# Patient Record
Sex: MALE | Race: WHITE | HISPANIC OR LATINO
[De-identification: names, ages, dates, MRNs, and addresses within clinical notes are randomized per-mention and may not be internally consistent; named-entity substitution may affect disease eponyms.]

---

## 2019-05-25 ENCOUNTER — APPOINTMENT (OUTPATIENT)
Dept: PEDIATRICS | Facility: CLINIC | Age: 19
End: 2019-05-25
Payer: COMMERCIAL

## 2019-05-25 VITALS
DIASTOLIC BLOOD PRESSURE: 80 MMHG | HEART RATE: 86 BPM | HEIGHT: 66.75 IN | WEIGHT: 259.2 LBS | BODY MASS INDEX: 40.68 KG/M2 | SYSTOLIC BLOOD PRESSURE: 124 MMHG

## 2019-05-25 DIAGNOSIS — E66.9 OBESITY, UNSPECIFIED: ICD-10-CM

## 2019-05-25 DIAGNOSIS — Z00.00 ENCOUNTER FOR GENERAL ADULT MEDICAL EXAMINATION W/OUT ABNORMAL FINDINGS: ICD-10-CM

## 2019-05-25 PROCEDURE — 99395 PREV VISIT EST AGE 18-39: CPT | Mod: 25

## 2019-05-25 PROCEDURE — 99051 MED SERV EVE/WKEND/HOLIDAY: CPT

## 2019-05-25 PROCEDURE — 96127 BRIEF EMOTIONAL/BEHAV ASSMT: CPT

## 2019-05-25 PROCEDURE — 90471 IMMUNIZATION ADMIN: CPT

## 2019-05-25 PROCEDURE — 90651 9VHPV VACCINE 2/3 DOSE IM: CPT

## 2019-05-25 NOTE — DISCUSSION/SUMMARY
[] : Counseling for  all components of the vaccines given today (see orders below) discussed with patient and patient’s parent/legal guardian. VIS statement provided as well. All questions answered. [Met privately with the adolescent for part of the office visit?] : Met privately with the adolescent for part of the office visit? Yes [Adolescent asks questions during each office  visit and participates in the care plan?] : Adolescent asks questions during each office visit and participates in the care plan? Yes [FreeTextEntry1] : Nutritional Counseling: \par Discussed 5-2-1-0 Healthy Habits Questionaire\par Goals: see care plan

## 2019-05-25 NOTE — HISTORY OF PRESENT ILLNESS
[Grade: ____] : Grade: [unfilled] [Normal Performance] : normal performance [Uses safety belts/safety equipment] : uses safety belts/safety equipment  [Yes] : Cigarette smoke exposure [No] : Patient has not had sexual intercourse [With Teen] : teen [Sleep Concerns] : no sleep concerns [Uses electronic nicotine delivery system] : does not use electronic nicotine delivery system [At least 1 hour of physical activity a day] : does not do at least 1 hour of physical activity a day [Drinks alcohol] : does not drink alcohol [Uses tobacco] : does not use tobacco [Uses drugs] : does not use drugs  [Impaired/distracted driving] : no impaired/distracted driving [FreeTextEntry7] : doing well [de-identified] : coughing and congested, had nosebleed few days ago and threw up blood - feeling a little better, no vomiting since [de-identified] : no fruits and vegetables [de-identified] : soccer in summer [FreeTextEntry1] : 18 yo male presents for well visit

## 2019-05-25 NOTE — PHYSICAL EXAM
[No Acute Distress] : no acute distress [Alert] : alert [Normocephalic] : normocephalic [EOMI Bilateral] : EOMI bilateral [Clear tympanic membranes with bony landmarks and light reflex present bilaterally] : clear tympanic membranes with bony landmarks and light reflex present bilaterally  [Supple, full passive range of motion] : supple, full passive range of motion [Pink Nasal Mucosa] : pink nasal mucosa [Nonerythematous Oropharynx] : nonerythematous oropharynx [Regular Rate and Rhythm] : regular rate and rhythm [Clear to Ausculatation Bilaterally] : clear to auscultation bilaterally [No Palpable Masses] : no palpable masses [+2 Femoral Pulses] : +2 femoral pulses [Soft] : soft [Normal S1, S2 audible] : normal S1, S2 audible [No Murmurs] : no murmurs [Non Distended] : non distended [NonTender] : non tender [Normoactive Bowel Sounds] : normoactive bowel sounds [No Splenomegaly] : no splenomegaly [No Hepatomegaly] : no hepatomegaly [Mitchell: _____] : Mitchell [unfilled] [No Gait Asymmetry] : no gait asymmetry [No Abnormal Lymph Nodes Palpated] : no abnormal lymph nodes palpated [Normal Muscle Tone] : normal muscle tone [Straight] : straight [No pain or deformities with palpation of bone, muscles, joints] : no pain or deformities with palpation of bone, muscles, joints [No Scoliosis] : no scoliosis [+2 Patella DTR] : +2 patella DTR [Cranial Nerves Grossly Intact] : cranial nerves grossly intact [No Rash or Lesions] : no rash or lesions

## 2019-05-25 NOTE — BEGINNING OF VISIT
[Patient] : patient [FreeTextEntry1] : discussed visit with patient/legal guardian in preferred language of English

## 2020-09-09 ENCOUNTER — EMERGENCY (EMERGENCY)
Facility: HOSPITAL | Age: 20
LOS: 1 days | Discharge: HOME | End: 2020-09-09
Attending: EMERGENCY MEDICINE
Payer: SELF-PAY

## 2020-09-09 VITALS
HEIGHT: 68 IN | OXYGEN SATURATION: 97 % | RESPIRATION RATE: 16 BRPM | WEIGHT: 240.08 LBS | SYSTOLIC BLOOD PRESSURE: 124 MMHG | TEMPERATURE: 98 F | DIASTOLIC BLOOD PRESSURE: 74 MMHG | HEART RATE: 68 BPM

## 2020-09-09 PROCEDURE — 99282 EMERGENCY DEPT VISIT SF MDM: CPT

## 2020-09-09 NOTE — ED PROVIDER NOTE - CHPI ED SYMPTOMS NEG
no fever/no numbness/no vomiting/no syncope/no blurred vision/no loss of consciousness/no nausea/no chills

## 2020-09-09 NOTE — ED ADULT TRIAGE NOTE - CHIEF COMPLAINT QUOTE
Pt A&Ox4 states "I have something in my ear." Patient heard something and is now having pain to left ear.

## 2020-09-09 NOTE — ED PROVIDER NOTE - OBJECTIVE STATEMENT
21 y/o male with no sign medical history presents to the ED c/o left ear pain. Pt notes pain since 3 days ago. Notes felt like he had a foreign body in his left ear. His cousin took a look in his ear and did not see anything. pt notes worsening pain to the left ear. Feels no active movement currently. Denies discharge, fevers, chills, loss of hearing, change in hearing.

## 2020-09-09 NOTE — ED ADULT NURSE NOTE - NSIMPLEMENTINTERV_GEN_ALL_ED
Implemented All Universal Safety Interventions:  [place] to call system. Call bell, personal items and telephone within reach. Instruct patient to call for assistance. Room bathroom lighting operational. Non-slip footwear when patient is off stretcher. Physically safe environment: no spills, clutter or unnecessary equipment. Stretcher in lowest position, wheels locked, appropriate side rails in place.

## 2020-09-09 NOTE — ED PROVIDER NOTE - ATTENDING CONTRIBUTION TO CARE
I, Teo George, performed a face to face bedside interview with this patient regarding history of present illness, review of symptoms and relevant past medical, social and family history.  I completed an independent physical examination. I have communicated the patient’s plan of care and disposition with the ACP.  20 year old male with no PMH presents with 3 days of L ear pain and foreign body sensation. No decreased hearing  Gen: NAD, well appearing  ENT: insect in L ear, Tm intact  Neuro: no focal deficits  Pt improved, irrigated and insect removed, stable for dc

## 2020-09-09 NOTE — ED PROVIDER NOTE - CARE PROVIDER_API CALL
Elder Larsen  OTOLARYNGOLOGY  [address] [place] [address]  Phone: [phone]  Fax: [phone]  Follow Up Time:

## 2020-09-09 NOTE — ED PROVIDER NOTE - CLINICAL SUMMARY MEDICAL DECISION MAKING FREE TEXT BOX
foreign body noted in left ear, spider killed with viscous lidocaine, flushed out with normal saline and syringe, TM clear intact, will have pt f/u with ENT

## 2020-09-09 NOTE — ED PROVIDER NOTE - PATIENT PORTAL LINK FT
You can access the FollowMyHealth Patient Portal offered by [facility] by registering at the following website: [email or web address]. By joining [facility]’s FollowMyHealth portal, you will also be able to view your health information using other applications (apps) compatible with our system.

## 2021-01-18 ENCOUNTER — TRANSCRIPTION ENCOUNTER (OUTPATIENT)
Age: 21
End: 2021-01-18

## 2025-09-21 ENCOUNTER — NON-APPOINTMENT (OUTPATIENT)
Age: 25
End: 2025-09-21